# Patient Record
Sex: FEMALE | Race: WHITE | NOT HISPANIC OR LATINO | URBAN - METROPOLITAN AREA
[De-identification: names, ages, dates, MRNs, and addresses within clinical notes are randomized per-mention and may not be internally consistent; named-entity substitution may affect disease eponyms.]

---

## 2017-05-12 ENCOUNTER — IMPORTED ENCOUNTER (OUTPATIENT)
Dept: URBAN - METROPOLITAN AREA CLINIC 50 | Facility: CLINIC | Age: 82
End: 2017-05-12

## 2017-05-22 ENCOUNTER — IMPORTED ENCOUNTER (OUTPATIENT)
Dept: URBAN - METROPOLITAN AREA CLINIC 50 | Facility: CLINIC | Age: 82
End: 2017-05-22

## 2017-08-24 ENCOUNTER — IMPORTED ENCOUNTER (OUTPATIENT)
Dept: URBAN - METROPOLITAN AREA CLINIC 50 | Facility: CLINIC | Age: 82
End: 2017-08-24

## 2017-09-05 ENCOUNTER — IMPORTED ENCOUNTER (OUTPATIENT)
Dept: URBAN - METROPOLITAN AREA CLINIC 50 | Facility: CLINIC | Age: 82
End: 2017-09-05

## 2018-08-24 ENCOUNTER — IMPORTED ENCOUNTER (OUTPATIENT)
Dept: URBAN - METROPOLITAN AREA CLINIC 50 | Facility: CLINIC | Age: 83
End: 2018-08-24

## 2018-09-28 ENCOUNTER — IMPORTED ENCOUNTER (OUTPATIENT)
Dept: URBAN - METROPOLITAN AREA CLINIC 50 | Facility: CLINIC | Age: 83
End: 2018-09-28

## 2019-10-25 ENCOUNTER — IMPORTED ENCOUNTER (OUTPATIENT)
Dept: URBAN - METROPOLITAN AREA CLINIC 50 | Facility: CLINIC | Age: 84
End: 2019-10-25

## 2019-12-05 ENCOUNTER — IMPORTED ENCOUNTER (OUTPATIENT)
Dept: URBAN - METROPOLITAN AREA CLINIC 50 | Facility: CLINIC | Age: 84
End: 2019-12-05

## 2020-01-02 ENCOUNTER — IMPORTED ENCOUNTER (OUTPATIENT)
Dept: URBAN - METROPOLITAN AREA CLINIC 50 | Facility: CLINIC | Age: 85
End: 2020-01-02

## 2020-05-06 NOTE — PATIENT DISCUSSION
Biopsy Lesion Eyelid: The patient had a lesion on the right upper eyelid. After informed consent the patient received a local anesthetic injection of 1% lidocaine with epinephrine 1:100,000 units. A section of the mass was excised with Sage scissors and sent to the pathology laboratory for evaluation. The patient was given written post operative care instructions and a prescription for antibiotic ointment. The patient was asked to call our office within 10 days for follow up if the patient had not heard from our office regarding the result of the biopsy before that time.

## 2020-05-06 NOTE — PATIENT DISCUSSION
PHOTOGRAPHS: I have reviewed the external ocular photographs of this patient which show the following: flat, area of pigmentation right upper medial canthus.

## 2020-12-02 ENCOUNTER — IMPORTED ENCOUNTER (OUTPATIENT)
Dept: URBAN - METROPOLITAN AREA CLINIC 50 | Facility: CLINIC | Age: 85
End: 2020-12-02

## 2021-03-22 ENCOUNTER — IMPORTED ENCOUNTER (OUTPATIENT)
Dept: URBAN - METROPOLITAN AREA CLINIC 50 | Facility: CLINIC | Age: 86
End: 2021-03-22

## 2021-04-18 ASSESSMENT — VISUAL ACUITY
OS_CC: J1+
OS_CC: J1+
OD_OTHER: 20/50. 20/70.
OS_CC: J1+
OS_SC: 20/30+
OD_BAT: 20/50
OD_SC: 20/20
OD_SC: 20/20
OS_SC: 20/40-
OS_SC: 20/30
OS_SC: 20/50
OD_CC: J1+
OS_SC: 20/40-1
OD_SC: 20/25-
OS_CC: J1
OD_CC: J1+
OD_CC: J1+
OS_PH: 20/30
OS_SC: 20/40-
OD_SC: 20/30
OD_CC: J1
OD_SC: 20/20-
OD_SC: 20/40

## 2021-04-18 ASSESSMENT — TONOMETRY
OD_IOP_MMHG: 11
OD_IOP_MMHG: 13
OD_IOP_MMHG: 12
OS_IOP_MMHG: 10
OS_IOP_MMHG: 12
OD_IOP_MMHG: 14
OD_IOP_MMHG: 11
OS_IOP_MMHG: 12
OS_IOP_MMHG: 12
OD_IOP_MMHG: 13
OS_IOP_MMHG: 12
OS_IOP_MMHG: 11

## 2021-10-14 ENCOUNTER — PROBLEM (OUTPATIENT)
Dept: URBAN - METROPOLITAN AREA CLINIC 49 | Facility: CLINIC | Age: 86
End: 2021-10-14

## 2021-10-14 DIAGNOSIS — H35.342: ICD-10-CM

## 2021-10-14 DIAGNOSIS — H35.3212: ICD-10-CM

## 2021-10-14 DIAGNOSIS — H43.813: ICD-10-CM

## 2021-10-14 DIAGNOSIS — H35.373: ICD-10-CM

## 2021-10-14 PROCEDURE — 92014 COMPRE OPH EXAM EST PT 1/>: CPT

## 2021-10-14 PROCEDURE — 92134 CPTRZ OPH DX IMG PST SGM RTA: CPT

## 2021-10-14 PROCEDURE — 92015 DETERMINE REFRACTIVE STATE: CPT

## 2021-10-14 ASSESSMENT — VISUAL ACUITY
OU_SC: J1+/-
OS_PH: 20/25-2
OD_SC: 20/25-2
OS_SC: 20/30-2

## 2021-10-14 ASSESSMENT — TONOMETRY
OS_IOP_MMHG: 12
OD_IOP_MMHG: 12

## 2022-05-05 ENCOUNTER — ESTABLISHED PATIENT (OUTPATIENT)
Dept: URBAN - METROPOLITAN AREA CLINIC 49 | Facility: CLINIC | Age: 87
End: 2022-05-05

## 2022-05-05 DIAGNOSIS — H35.342: ICD-10-CM

## 2022-05-05 DIAGNOSIS — H35.3212: ICD-10-CM

## 2022-05-05 DIAGNOSIS — H35.373: ICD-10-CM

## 2022-05-05 DIAGNOSIS — H43.813: ICD-10-CM

## 2022-05-05 DIAGNOSIS — H16.223: ICD-10-CM

## 2022-05-05 PROCEDURE — 92134 CPTRZ OPH DX IMG PST SGM RTA: CPT

## 2022-05-05 PROCEDURE — 92014 COMPRE OPH EXAM EST PT 1/>: CPT

## 2022-05-05 ASSESSMENT — TONOMETRY
OD_IOP_MMHG: 12
OS_IOP_MMHG: 12

## 2022-05-05 ASSESSMENT — VISUAL ACUITY
OD_SC: 20/40
OS_SC: 20/40-1

## 2022-05-05 NOTE — PATIENT DISCUSSION
Patient under the care of [address] [place] [address] with history of injections OD. Patient if followed every 6 months.

## 2022-05-05 NOTE — PATIENT DISCUSSION
Recommend PF artificial tears 4 times daily in both eyes for best vision and comfort. Brands such as Refresh, Thera Tears, and Systane are good options. Start gel drops QHS OU. Start warm compresses BID OU. Discussed RX drops if dryness does not improve. Discussed with patient the dryness could be causing her blurry vision. Sample of Systane given.

## 2022-10-13 ENCOUNTER — COMPREHENSIVE EXAM (OUTPATIENT)
Dept: URBAN - METROPOLITAN AREA CLINIC 49 | Facility: CLINIC | Age: 87
End: 2022-10-13

## 2022-10-13 DIAGNOSIS — H35.3212: ICD-10-CM

## 2022-10-13 DIAGNOSIS — H43.813: ICD-10-CM

## 2022-10-13 DIAGNOSIS — H02.831: ICD-10-CM

## 2022-10-13 DIAGNOSIS — H35.373: ICD-10-CM

## 2022-10-13 DIAGNOSIS — H16.223: ICD-10-CM

## 2022-10-13 DIAGNOSIS — H35.342: ICD-10-CM

## 2022-10-13 DIAGNOSIS — H02.834: ICD-10-CM

## 2022-10-13 PROCEDURE — 92134 CPTRZ OPH DX IMG PST SGM RTA: CPT

## 2022-10-13 PROCEDURE — 92014 COMPRE OPH EXAM EST PT 1/>: CPT

## 2022-10-13 ASSESSMENT — VISUAL ACUITY
OS_SC: J1+@16"
OS_SC: 20/40
OD_SC: J1@16"
OD_SC: 20/40

## 2022-10-13 ASSESSMENT — TONOMETRY
OD_IOP_MMHG: 12
OS_IOP_MMHG: 10

## 2022-10-13 NOTE — PATIENT DISCUSSION
Patient under the care of [address] [place] [address] with history of injections OD. Patient if followed every 6 months.

## 2023-10-18 ENCOUNTER — COMPREHENSIVE EXAM (OUTPATIENT)
Dept: URBAN - METROPOLITAN AREA CLINIC 49 | Facility: LOCATION | Age: 88
End: 2023-10-18

## 2023-10-18 DIAGNOSIS — H02.831: ICD-10-CM

## 2023-10-18 DIAGNOSIS — H16.223: ICD-10-CM

## 2023-10-18 DIAGNOSIS — H35.3212: ICD-10-CM

## 2023-10-18 DIAGNOSIS — Z01.01: ICD-10-CM

## 2023-10-18 DIAGNOSIS — H35.373: ICD-10-CM

## 2023-10-18 DIAGNOSIS — H02.834: ICD-10-CM

## 2023-10-18 DIAGNOSIS — H43.813: ICD-10-CM

## 2023-10-18 DIAGNOSIS — H35.342: ICD-10-CM

## 2023-10-18 PROCEDURE — 92015 DETERMINE REFRACTIVE STATE: CPT

## 2023-10-18 PROCEDURE — 92014 COMPRE OPH EXAM EST PT 1/>: CPT

## 2023-10-18 ASSESSMENT — TONOMETRY
OD_IOP_MMHG: 15
OS_IOP_MMHG: 14

## 2023-10-18 ASSESSMENT — VISUAL ACUITY
OS_PH: 20/25
OD_PH: 20/25
OD_SC: 20/30+2
OU_SC: J1+@16"
OS_SC: 20/40

## 2024-10-24 ENCOUNTER — COMPREHENSIVE EXAM (OUTPATIENT)
Dept: URBAN - METROPOLITAN AREA CLINIC 49 | Facility: LOCATION | Age: 89
End: 2024-10-24

## 2024-10-24 DIAGNOSIS — H02.831: ICD-10-CM

## 2024-10-24 DIAGNOSIS — H35.3212: ICD-10-CM

## 2024-10-24 DIAGNOSIS — H35.342: ICD-10-CM

## 2024-10-24 DIAGNOSIS — H02.834: ICD-10-CM

## 2024-10-24 DIAGNOSIS — H35.373: ICD-10-CM

## 2024-10-24 DIAGNOSIS — H16.223: ICD-10-CM

## 2024-10-24 DIAGNOSIS — H43.813: ICD-10-CM

## 2024-10-24 PROCEDURE — 92134 CPTRZ OPH DX IMG PST SGM RTA: CPT

## 2024-10-24 PROCEDURE — 99214 OFFICE O/P EST MOD 30 MIN: CPT
